# Patient Record
Sex: FEMALE | URBAN - METROPOLITAN AREA
[De-identification: names, ages, dates, MRNs, and addresses within clinical notes are randomized per-mention and may not be internally consistent; named-entity substitution may affect disease eponyms.]

---

## 2022-09-26 ENCOUNTER — APPOINTMENT (OUTPATIENT)
Age: 52
Setting detail: DERMATOLOGY
End: 2022-09-27

## 2022-09-26 DIAGNOSIS — L65.0 TELOGEN EFFLUVIUM: ICD-10-CM

## 2022-09-26 PROCEDURE — OTHER PRESCRIPTION MEDICATION MANAGEMENT: OTHER

## 2022-09-26 PROCEDURE — 99203 OFFICE O/P NEW LOW 30 MIN: CPT

## 2022-09-26 PROCEDURE — OTHER ADDITIONAL NOTES: OTHER

## 2022-09-26 PROCEDURE — OTHER MIPS QUALITY: OTHER

## 2022-09-26 PROCEDURE — OTHER COUNSELING: OTHER

## 2022-09-26 PROCEDURE — OTHER PRESCRIPTION: OTHER

## 2022-09-26 RX ORDER — CLOBETASOL PROPIONATE 0.5 MG/ML
SOLUTION TOPICAL QHS
Qty: 1 | Refills: 2 | Status: ERX | COMMUNITY
Start: 2022-09-26

## 2022-09-26 ASSESSMENT — LOCATION ZONE DERM: LOCATION ZONE: SCALP

## 2022-09-26 ASSESSMENT — LOCATION SIMPLE DESCRIPTION DERM: LOCATION SIMPLE: LEFT SCALP

## 2022-09-26 ASSESSMENT — LOCATION DETAILED DESCRIPTION DERM: LOCATION DETAILED: LEFT MEDIAL FRONTAL SCALP

## 2022-09-26 NOTE — PROCEDURE: PRESCRIPTION MEDICATION MANAGEMENT
Detail Level: Zone
Initiate Treatment: Clobetasol, head and shoulders
Render In Strict Bullet Format?: No

## 2022-09-26 NOTE — PROCEDURE: ADDITIONAL NOTES
Render Risk Assessment In Note?: no
Detail Level: Simple
Additional Notes: Pt instructed to use head and shoulders, given samples